# Patient Record
Sex: FEMALE | Race: BLACK OR AFRICAN AMERICAN
[De-identification: names, ages, dates, MRNs, and addresses within clinical notes are randomized per-mention and may not be internally consistent; named-entity substitution may affect disease eponyms.]

---

## 2020-02-05 ENCOUNTER — HOSPITAL ENCOUNTER (EMERGENCY)
Dept: HOSPITAL 25 - UCCORT | Age: 20
Discharge: HOME | End: 2020-02-05
Payer: MEDICAID

## 2020-02-05 VITALS — SYSTOLIC BLOOD PRESSURE: 120 MMHG | DIASTOLIC BLOOD PRESSURE: 70 MMHG

## 2020-02-05 DIAGNOSIS — R68.83: ICD-10-CM

## 2020-02-05 DIAGNOSIS — R51: ICD-10-CM

## 2020-02-05 DIAGNOSIS — R31.9: ICD-10-CM

## 2020-02-05 DIAGNOSIS — B34.9: Primary | ICD-10-CM

## 2020-02-05 DIAGNOSIS — R53.83: ICD-10-CM

## 2020-02-05 PROCEDURE — 99211 OFF/OP EST MAY X REQ PHY/QHP: CPT

## 2020-02-05 PROCEDURE — G0463 HOSPITAL OUTPT CLINIC VISIT: HCPCS

## 2020-02-05 NOTE — UC
Throat Pain/Nasal Neftaly HPI





- HPI Summary


HPI Summary: 





fever x 1 day 


chills, body aches, fatigue, having headaches


denies any cough , no sore throat,


was diagnosed with UTI yesterday , is on bactrim DS


no abdominal pain , no flank pain , no n/v/d/c 








- History of Current Complaint


Chief Complaint: UCGeneralIllness


Stated Complaint: FEVER,CHILLS,HEADACHE


Time Seen by Provider: 02/05/20 12:08


Hx Obtained From: Patient


Hx Last Menstrual Period: 1/27/20


Pregnant?: No


Onset/Duration: Gradual Onset, Lasting Days - 1, Still Present


Severity: Moderate


Pain Intensity: 7


Cough: None


Associated Signs & Symptoms: Positive: Fever.  Negative: Wheezing, Hoarseness, 

Sinus Discomfort, Nasal Discharge, Rash





- Allergies/Home Medications


Allergies/Adverse Reactions: 


 Allergies











Allergy/AdvReac Type Severity Reaction Status Date / Time


 


No Known Allergies Allergy   Verified 02/05/20 12:04











Home Medications: 


 Home Medications





Acetaminophen [Tylenol Extra Strength] 1,000 mg PO ONCE PRN 02/05/20 [History 

Confirmed 02/05/20]


Sulfamethox/Trimethoprim DS* [Bactrim /160 TAB*] 1 tab PO BID 02/05/20 [

History Confirmed 02/05/20]











PMH/Surg Hx/FS Hx/Imm Hx


Previously Healthy: Yes





- Surgical History


Surgical History: None





- Family History


Known Family History: Positive: Cardiac Disease, Hypertension, Diabetes





- Social History


Alcohol Use: Occasionally


Substance Use Type: None


Smoking Status (MU): Never Smoked Tobacco





Review of Systems


All Other Systems Reviewed And Are Negative: Yes


Constitutional: Positive: Fever, Chills, Fatigue


Skin: Positive: Negative


Eyes: Positive: Negative


ENT: Positive: Negative


Respiratory: Positive: Negative


Cardiovascular: Positive: Negative


Genitourinary: Positive: Hematuria


Motor: Positive: Negative


Is Patient Immunocompromised?: No





Physical Exam


Triage Information Reviewed: Yes


Appearance: Well-Appearing, No Pain Distress, Well-Nourished


Vital Signs: 


 Initial Vital Signs











Temp  98.2 F   02/05/20 12:02


 


Pulse  107   02/05/20 12:02


 


Resp  15   02/05/20 12:02


 


BP  120/70   02/05/20 12:02


 


Pulse Ox  99   02/05/20 12:02











Vital Signs Reviewed: Yes


Eye Exam: Normal


Eyes: Positive: Conjunctiva Clear


ENT: Positive: Normal ENT inspection, Hearing grossly normal, Pharynx normal


Neck: Positive: Supple, Nontender, No Lymphadenopathy


Respiratory: Positive: Chest non-tender, Lungs clear, Normal breath sounds


Cardiovascular: Positive: Tachycardia


Abdominal Exam: Normal


Abdomen Description: Positive: Nontender, Soft.  Negative: CVA Tenderness (R), 

CVA Tenderness (L), Distended, Guarding


Bowel Sounds: Positive: Present





Throat Pain/Nasal Course/Dx





- Differential Dx/Diagnosis


Provider Diagnosis: 


 Viral illness








Discharge ED





- Sign-Out/Discharge


Documenting (check all that apply): Patient Departure


All imaging exams completed and their final reports reviewed: No Studies





- Discharge Plan


Condition: Stable


Disposition: HOME


Patient Education Materials:  Viral Syndrome (ED)


Referrals: 


No Primary Care Phys,NOPCP [Primary Care Provider] - If Needed





- Billing Disposition and Condition


Condition: STABLE


Disposition: Home